# Patient Record
Sex: FEMALE | HISPANIC OR LATINO | URBAN - METROPOLITAN AREA
[De-identification: names, ages, dates, MRNs, and addresses within clinical notes are randomized per-mention and may not be internally consistent; named-entity substitution may affect disease eponyms.]

---

## 2023-01-31 ENCOUNTER — APPOINTMENT (RX ONLY)
Dept: URBAN - METROPOLITAN AREA CLINIC 335 | Facility: CLINIC | Age: 33
Setting detail: DERMATOLOGY
End: 2023-01-31

## 2023-01-31 DIAGNOSIS — L70.0 ACNE VULGARIS: ICD-10-CM | Status: INADEQUATELY CONTROLLED

## 2023-01-31 PROCEDURE — ? COUNSELING

## 2023-01-31 PROCEDURE — ? IN-HOUSE DISPENSING PHARMACY

## 2023-01-31 PROCEDURE — 99204 OFFICE O/P NEW MOD 45 MIN: CPT

## 2023-01-31 PROCEDURE — ? PRESCRIPTION

## 2023-01-31 RX ORDER — SPIRONOLACTONE 50 MG/1
TABLET, FILM COATED ORAL
Qty: 60 | Refills: 0 | Status: ERX | COMMUNITY
Start: 2023-01-31

## 2023-01-31 RX ORDER — AZELAIC ACID 0.15 G/G
GEL TOPICAL
Qty: 50 | Refills: 1 | Status: ERX | COMMUNITY
Start: 2023-01-31

## 2023-01-31 RX ORDER — DAPSONE 50 MG/G
GEL TOPICAL
Qty: 60 | Refills: 3 | Status: ERX | COMMUNITY
Start: 2023-01-31

## 2023-01-31 RX ADMIN — AZELAIC ACID: 0.15 GEL TOPICAL at 00:00

## 2023-01-31 RX ADMIN — SPIRONOLACTONE: 50 TABLET, FILM COATED ORAL at 00:00

## 2023-01-31 RX ADMIN — DAPSONE: 50 GEL TOPICAL at 00:00

## 2023-01-31 ASSESSMENT — LOCATION SIMPLE DESCRIPTION DERM: LOCATION SIMPLE: LEFT CHEEK

## 2023-01-31 ASSESSMENT — LOCATION ZONE DERM: LOCATION ZONE: FACE

## 2023-01-31 ASSESSMENT — LOCATION DETAILED DESCRIPTION DERM: LOCATION DETAILED: LEFT INFERIOR MEDIAL MALAR CHEEK

## 2023-01-31 NOTE — PROCEDURE: COUNSELING
High Dose Vitamin A Counseling: Side effects reviewed, pt to contact office should one occur.
Prescription Monitoring Program activity reviewed with no discrepancies noted.      Last fill per : 7/6/21  Quantity/days supply: 60 tab x 30 days     Controlled Substance Agreement on file: Yes  Date: 9/2019- Needs New CSA    Last office visit with provider:  7/13/21    Please advise.      
Topical Retinoid counseling:  Patient advised to apply a pea-sized amount only at bedtime and wait 30 minutes after washing their face before applying.  If too drying, patient may add a non-comedogenic moisturizer. The patient verbalized understanding of the proper use and possible adverse effects of retinoids.  All of the patient's questions and concerns were addressed.
Minocycline Counseling: Patient advised regarding possible photosensitivity and discoloration of the teeth, skin, lips, tongue and gums.  Patient instructed to avoid sunlight, if possible.  When exposed to sunlight, patients should wear protective clothing, sunglasses, and sunscreen.  The patient was instructed to call the office immediately if the following severe adverse effects occur:  hearing changes, easy bruising/bleeding, severe headache, or vision changes.  The patient verbalized understanding of the proper use and possible adverse effects of minocycline.  All of the patient's questions and concerns were addressed.
Tazorac Counseling:  Patient advised that medication is irritating and drying.  Patient may need to apply sparingly and wash off after an hour before eventually leaving it on overnight.  The patient verbalized understanding of the proper use and possible adverse effects of tazorac.  All of the patient's questions and concerns were addressed.
Doxycycline Pregnancy And Lactation Text: This medication is Pregnancy Category D and not consider safe during pregnancy. It is also excreted in breast milk but is considered safe for shorter treatment courses.
Topical Clindamycin Counseling: Patient counseled that this medication may cause skin irritation or allergic reactions.  In the event of skin irritation, the patient was advised to reduce the amount of the drug applied or use it less frequently.   The patient verbalized understanding of the proper use and possible adverse effects of clindamycin.  All of the patient's questions and concerns were addressed.
Azithromycin Counseling:  I discussed with the patient the risks of azithromycin including but not limited to GI upset, allergic reaction, drug rash, diarrhea, and yeast infections.
Tetracycline Pregnancy And Lactation Text: This medication is Pregnancy Category D and not consider safe during pregnancy. It is also excreted in breast milk.
Dapsone Pregnancy And Lactation Text: This medication is Pregnancy Category C and is not considered safe during pregnancy or breast feeding.
Use Enhanced Medication Counseling?: No
Aklief Pregnancy And Lactation Text: It is unknown if this medication is safe to use during pregnancy.  It is unknown if this medication is excreted in breast milk.  Breastfeeding women should use the topical cream on the smallest area of the skin for the shortest time needed while breastfeeding.  Do not apply to nipple and areola.
Azelaic Acid Counseling: Patient counseled that medicine may cause skin irritation and to avoid applying near the eyes.  In the event of skin irritation, the patient was advised to reduce the amount of the drug applied or use it less frequently.   The patient verbalized understanding of the proper use and possible adverse effects of azelaic acid.  All of the patient's questions and concerns were addressed.
Benzoyl Peroxide Counseling: Patient counseled that medicine may cause skin irritation and bleach clothing.  In the event of skin irritation, the patient was advised to reduce the amount of the drug applied or use it less frequently.   The patient verbalized understanding of the proper use and possible adverse effects of benzoyl peroxide.  All of the patient's questions and concerns were addressed.
Erythromycin Counseling:  I discussed with the patient the risks of erythromycin including but not limited to GI upset, allergic reaction, drug rash, diarrhea, increase in liver enzymes, and yeast infections.
Birth Control Pills Pregnancy And Lactation Text: This medication should be avoided if pregnant and for the first 30 days post-partum.
Topical Sulfur Applications Pregnancy And Lactation Text: This medication is Pregnancy Category C and has an unknown safety profile during pregnancy. It is unknown if this topical medication is excreted in breast milk.
Bactrim Pregnancy And Lactation Text: This medication is Pregnancy Category D and is known to cause fetal risk.  It is also excreted in breast milk.
Isotretinoin Counseling: Patient should get monthly blood tests, not donate blood, not drive at night if vision affected, not share medication, and not undergo elective surgery for 6 months after tx completed. Side effects reviewed, pt to contact office should one occur.
Winlevi Pregnancy And Lactation Text: This medication is considered safe during pregnancy and breastfeeding.
Spironolactone Pregnancy And Lactation Text: This medication can cause feminization of the male fetus and should be avoided during pregnancy. The active metabolite is also found in breast milk.
Tetracycline Counseling: Patient counseled regarding possible photosensitivity and increased risk for sunburn.  Patient instructed to avoid sunlight, if possible.  When exposed to sunlight, patients should wear protective clothing, sunglasses, and sunscreen.  The patient was instructed to call the office immediately if the following severe adverse effects occur:  hearing changes, easy bruising/bleeding, severe headache, or vision changes.  The patient verbalized understanding of the proper use and possible adverse effects of tetracycline.  All of the patient's questions and concerns were addressed. Patient understands to avoid pregnancy while on therapy due to potential birth defects.
Topical Retinoid Pregnancy And Lactation Text: This medication is Pregnancy Category C. It is unknown if this medication is excreted in breast milk.
Aklief counseling:  Patient advised to apply a pea-sized amount only at bedtime and wait 30 minutes after washing their face before applying.  If too drying, patient may add a non-comedogenic moisturizer.  The most commonly reported side effects including irritation, redness, scaling, dryness, stinging, burning, itching, and increased risk of sunburn.  The patient verbalized understanding of the proper use and possible adverse effects of retinoids.  All of the patient's questions and concerns were addressed.
Dapsone Counseling: I discussed with the patient the risks of dapsone including but not limited to hemolytic anemia, agranulocytosis, rashes, methemoglobinemia, kidney failure, peripheral neuropathy, headaches, GI upset, and liver toxicity.  Patients who start dapsone require monitoring including baseline LFTs and weekly CBCs for the first month, then every month thereafter.  The patient verbalized understanding of the proper use and possible adverse effects of dapsone.  All of the patient's questions and concerns were addressed.
Tazorac Pregnancy And Lactation Text: This medication is not safe during pregnancy. It is unknown if this medication is excreted in breast milk.
Azithromycin Pregnancy And Lactation Text: This medication is considered safe during pregnancy and is also secreted in breast milk.
Doxycycline Counseling:  Patient counseled regarding possible photosensitivity and increased risk for sunburn.  Patient instructed to avoid sunlight, if possible.  When exposed to sunlight, patients should wear protective clothing, sunglasses, and sunscreen.  The patient was instructed to call the office immediately if the following severe adverse effects occur:  hearing changes, easy bruising/bleeding, severe headache, or vision changes.  The patient verbalized understanding of the proper use and possible adverse effects of doxycycline.  All of the patient's questions and concerns were addressed.
High Dose Vitamin A Pregnancy And Lactation Text: High dose vitamin A therapy is contraindicated during pregnancy and breast feeding.
Topical Clindamycin Pregnancy And Lactation Text: This medication is Pregnancy Category B and is considered safe during pregnancy. It is unknown if it is excreted in breast milk.
Sarecycline Counseling: Patient advised regarding possible photosensitivity and discoloration of the teeth, skin, lips, tongue and gums.  Patient instructed to avoid sunlight, if possible.  When exposed to sunlight, patients should wear protective clothing, sunglasses, and sunscreen.  The patient was instructed to call the office immediately if the following severe adverse effects occur:  hearing changes, easy bruising/bleeding, severe headache, or vision changes.  The patient verbalized understanding of the proper use and possible adverse effects of sarecycline.  All of the patient's questions and concerns were addressed.
Azelaic Acid Pregnancy And Lactation Text: This medication is considered safe during pregnancy and breast feeding.
Detail Level: Zone
Spironolactone Counseling: Patient advised regarding risks of diarrhea, abdominal pain, hyperkalemia, birth defects (for female patients), liver toxicity and renal toxicity. The patient may need blood work to monitor liver and kidney function and potassium levels while on therapy. The patient verbalized understanding of the proper use and possible adverse effects of spironolactone.  All of the patient's questions and concerns were addressed.
Topical Sulfur Applications Counseling: Topical Sulfur Counseling: Patient counseled that this medication may cause skin irritation or allergic reactions.  In the event of skin irritation, the patient was advised to reduce the amount of the drug applied or use it less frequently.   The patient verbalized understanding of the proper use and possible adverse effects of topical sulfur application.  All of the patient's questions and concerns were addressed.
Bactrim Counseling:  I discussed with the patient the risks of sulfa antibiotics including but not limited to GI upset, allergic reaction, drug rash, diarrhea, dizziness, photosensitivity, and yeast infections.  Rarely, more serious reactions can occur including but not limited to aplastic anemia, agranulocytosis, methemoglobinemia, blood dyscrasias, liver or kidney failure, lung infiltrates or desquamative/blistering drug rashes.
Winlevi Counseling:  I discussed with the patient the risks of topical clascoterone including but not limited to erythema, scaling, itching, and stinging. Patient voiced their understanding.
Birth Control Pills Counseling: Birth Control Pill Counseling: I discussed with the patient the potential side effects of OCPs including but not limited to increased risk of stroke, heart attack, thrombophlebitis, deep venous thrombosis, hepatic adenomas, breast changes, GI upset, headaches, and depression.  The patient verbalized understanding of the proper use and possible adverse effects of OCPs. All of the patient's questions and concerns were addressed.
Erythromycin Pregnancy And Lactation Text: This medication is Pregnancy Category B and is considered safe during pregnancy. It is also excreted in breast milk.
Isotretinoin Pregnancy And Lactation Text: This medication is Pregnancy Category X and is considered extremely dangerous during pregnancy. It is unknown if it is excreted in breast milk.
Benzoyl Peroxide Pregnancy And Lactation Text: This medication is Pregnancy Category C. It is unknown if benzoyl peroxide is excreted in breast milk.

## 2023-01-31 NOTE — PROCEDURE: IN-HOUSE DISPENSING PHARMACY
Product 32 Refills: 0
Product 65 Unit Type: mg
Product 14 Refills: 3
Product 14 Amount/Unit (Numbers Only): 30
Detail Level: Zone
Product 18 Unit Type: tablets
Product 2 Price/Unit (In Dollars): 50
Name Of Product 19: Doxycycline
Product 21 Price/Unit (In Dollars): 45
Product 6 Application Directions: Apply to the affected areas on the scalp Monday, Wednesday and Friday.
Product 12 Application Directions: Apply a thin layer of Melasma Emulsion to the entire face at night time. For ultimate results we recommend using the Melasma Emulsion with c plus complex, a product sold through our office. Every morning you will apply  a thin layer  of c plus complex to the entire face. You will do the above regimen for 2 months. After 2 months, you will temporarily discontinue the Melasma Emulsion & only use the c plus complex twice a day for 2 months. After the 4 months, we recommend that you have a follow up appointment with your provider to evaluate if any other changes are needed.   If the  skin gets too dry or irritated with the Melasma Emulsion, then use it every third night.  The Melasma Emulsion will make your skin more sun-sensitive. Use a sunscreen daily of at least SPF 30, also \\n reapplying throughout the day is very important. Because Melasma Emulsion not meant to be used long term due to the potential side effects, we recommend to use lytera on your off days.
Product 6 Unit Type: ml
Product 23 Application Directions: Take 1 tablet every morning for 5 days
Send Charges To Patient Encounter: Yes
Name Of Product 11: Antibacterial ointment ( METRONIDAZOLE 1% / MUPIROCIN 2%)
Product 25 Amount/Unit (Numbers Only): 14
Product 4 Amount/Unit (Numbers Only): 30
Product 12 Unit Type: grams
Name Of Product 13: Rosacea Cream (AZELAIC ACID 15% / NIACINAMIDE 4%)
Product 17 Price/Unit (In Dollars): 20
Product 17 Refills: 4
Product 26 Price/Unit (In Dollars): 10
Product 11 Application Directions: Apply to affected area twice a day
Product 19 Application Directions: Take 1 Capsule twice daily
Name Of Product 7: Anti-fungal nail solution (CICLOPIROX 8% / FLUCONAZOLE 1% / TERBINAFINE HCL 1%)
Product 21 Amount/Unit (Numbers Only): 60
Name Of Product 24: Spironlactone
Name Of Product 22: Prednisone 20 mg
Product 15 Application Directions: Shampoo into hair three times a week
Product 9 Application Directions: Apply to affected area twice a day, three times a week
Product 26 Application Directions: Take 1 capsule twice daily for 1 week
Product 1 Price/Unit (In Dollars): 55
Product 20 Price/Unit (In Dollars): 15
Product 5 Application Directions: Apply to the affected areas as directed by your physician. You may experience mild skin irritation, itching, dryness, flaking, scabbing, crusting, redness, or hardening of the skin where medicine was applied. It is important to \\navoid excessive sun exposure and to use sunscreen of 30 SPF or higher.
Name Of Product 10: Fungal dermatitis cream ( (HYDROCORTISONE 2.5% / KETOCONAZOLE 2%)
Name Of Product 18: Bactrim DS
Product 3 Application Directions: Apply a thin layer to face every morning
Product 22 Application Directions: Take 3 tablets by mouth for 3 days, Then take 2 tablets for 3 days, then take 1 tablet for 3 days
Name Of Product 16: Dermatitis topical solution (CLOBETASOL PROPIONATE 0.05% / NIACINAMIDE 4%)
Name Of Product 4: Acne gel  (ADAPALENE 0.3% / BENZOYL PEROXIDE 2.5% / NIACINAMIDE 4%)
Name Of Product 6: Alopecia Solution (FLUOCINOLONE ACETONIDE 0.01% / MINOXIDIL 5% / TRETINOIN 0.025%)
Product 8 Price/Unit (In Dollars): 35
Product 16 Application Directions: Apply a small amount to affected areas three times a week
Product 8 Refills: 2
Name Of Product 23: Prednisone 50 mg
Product 10 Application Directions: Apply to affected are twice a day
Product 18 Application Directions: Take 1 tablet twice daily for 1 week
Product 8 Application Directions: Apply to affected area twice daily
Product 14 Application Directions: Apply a small amount to face once daily
Name Of Product 2: Acne moisturizing cream (NIACINAMIDE 4% / TRETINOIN 0.05% / HYALURONIC ACID 0.5% CREAM)
Name Of Product 21: Minocycline
Product 4 Application Directions: Apply a thin layer to face every night
Product 23 Amount/Unit (Numbers Only): 5
Product 25 Unit Type: capsules
Name Of Product 15: Anti-Fungal Shampoo ( KETOCONAZOLE 2% / SALICYLIC ACID 2% / ZINC PYRITHIONE 0.2%)
Name Of Product 17: Xerosis Gel ( ALOE VERA 1% / LACTIC ACID 10% / UREA 40%)
Name Of Product 9: Dermatitis cream  (CLOBETASOL PROPIONATE 0.05% / NIACINAMIDE 4%)
Product 21 Application Directions: Take 1 capsule by mouth twice daily
Name Of Product 26: Cephalexin
Product 17 Application Directions: Apply a small amount to affected areas twice daily
Name Of Product 3: Acne gel combo  (BENZOYL PEROXIDE 5% / CLINDAMYCIN 1% / NIACINAMIDE 4%)
Name Of Product 5: Actinic Keratosis Gel (IMIQUIMOD 5% / LEVOCETIRIZINE DIHYDROCHLORIDE 1% / TRETINOIN 0.05%)
Product 17 Amount/Unit (Numbers Only): 120
Product 3 Price/Unit (In Dollars): 40
Product 9 Amount/Unit (Numbers Only): 60
Product 13 Application Directions: Apply to affected areas of the face twice daily.
Product 7 Application Directions: Apply to the affected nails every night. Once a week clean nails off     \\nwith alcohol or acetone.
Name Of Product 1: Acne Gel (DAPSONE 8.5% / NIACINAMIDE 4%)
Name Of Product 12: Melasma Emulsion  (HYDROQUINONE 4% / TRETINOIN 0.025% / TRIAMCINOLONE ACETONIDE 0.025%)
Name Of Product 20: Loratadine
Product 24 Application Directions: Take 1 tablet twice daily
Product 12 Units Dispensed: 1
Product 20 Application Directions: Take 1 tablet by mouth daily
Product 22 Amount/Unit (Numbers Only): 18
Name Of Product 8: Anti-fungal cream (ECONAZOLE NITRATE 1% / NIACINAMIDE 4%)
Name Of Product 14: Rosacea Silicone Gel (IVERMECTIN 1% / METRONIDAZOLE 1% / NIACINAMIDE 4% / POTASSIUM AZELOYL DIGLYCINATE 5%)

## 2023-02-28 ENCOUNTER — RX ONLY (OUTPATIENT)
Age: 33
Setting detail: RX ONLY
End: 2023-02-28

## 2023-02-28 RX ORDER — SPIRONOLACTONE 50 MG/1
TABLET, FILM COATED ORAL
Qty: 60 | Refills: 1 | Status: ERX

## 2023-03-27 ENCOUNTER — APPOINTMENT (RX ONLY)
Dept: URBAN - METROPOLITAN AREA CLINIC 335 | Facility: CLINIC | Age: 33
Setting detail: DERMATOLOGY
End: 2023-03-27

## 2023-03-27 DIAGNOSIS — L70.0 ACNE VULGARIS: ICD-10-CM

## 2023-03-27 PROCEDURE — ? PRESCRIPTION MEDICATION MANAGEMENT

## 2023-03-27 PROCEDURE — ? IN-HOUSE DISPENSING PHARMACY

## 2023-03-27 PROCEDURE — ? PRESCRIPTION

## 2023-03-27 PROCEDURE — ? COUNSELING

## 2023-03-27 PROCEDURE — 99214 OFFICE O/P EST MOD 30 MIN: CPT

## 2023-03-27 RX ORDER — TRETIONIN 0.25 MG/G
CREAM TOPICAL QHS
Qty: 45 | Refills: 3 | Status: ERX | COMMUNITY
Start: 2023-03-27

## 2023-03-27 RX ADMIN — TRETIONIN: 0.25 CREAM TOPICAL at 00:00

## 2023-03-27 ASSESSMENT — LOCATION DETAILED DESCRIPTION DERM: LOCATION DETAILED: LEFT INFERIOR MEDIAL MALAR CHEEK

## 2023-03-27 ASSESSMENT — LOCATION SIMPLE DESCRIPTION DERM: LOCATION SIMPLE: LEFT CHEEK

## 2023-03-27 ASSESSMENT — LOCATION ZONE DERM: LOCATION ZONE: FACE

## 2023-03-27 NOTE — PROCEDURE: PRESCRIPTION MEDICATION MANAGEMENT
Render In Strict Bullet Format?: No
Continue Regimen: spironolactone 50 mg tablet \\nSig: Take 1 tablet twice a day\\n\\ndapsone 5 % topical gel \\nSig: Apply to affected areas of the face every morning
Detail Level: Zone

## 2023-03-27 NOTE — PROCEDURE: COUNSELING
High Dose Vitamin A Counseling: Side effects reviewed, pt to contact office should one occur.
Topical Retinoid counseling:  Patient advised to apply a pea-sized amount only at bedtime and wait 30 minutes after washing their face before applying.  If too drying, patient may add a non-comedogenic moisturizer. The patient verbalized understanding of the proper use and possible adverse effects of retinoids.  All of the patient's questions and concerns were addressed.
Minocycline Counseling: Patient advised regarding possible photosensitivity and discoloration of the teeth, skin, lips, tongue and gums.  Patient instructed to avoid sunlight, if possible.  When exposed to sunlight, patients should wear protective clothing, sunglasses, and sunscreen.  The patient was instructed to call the office immediately if the following severe adverse effects occur:  hearing changes, easy bruising/bleeding, severe headache, or vision changes.  The patient verbalized understanding of the proper use and possible adverse effects of minocycline.  All of the patient's questions and concerns were addressed.
Tazorac Counseling:  Patient advised that medication is irritating and drying.  Patient may need to apply sparingly and wash off after an hour before eventually leaving it on overnight.  The patient verbalized understanding of the proper use and possible adverse effects of tazorac.  All of the patient's questions and concerns were addressed.
Doxycycline Pregnancy And Lactation Text: This medication is Pregnancy Category D and not consider safe during pregnancy. It is also excreted in breast milk but is considered safe for shorter treatment courses.
Topical Clindamycin Counseling: Patient counseled that this medication may cause skin irritation or allergic reactions.  In the event of skin irritation, the patient was advised to reduce the amount of the drug applied or use it less frequently.   The patient verbalized understanding of the proper use and possible adverse effects of clindamycin.  All of the patient's questions and concerns were addressed.
Azithromycin Counseling:  I discussed with the patient the risks of azithromycin including but not limited to GI upset, allergic reaction, drug rash, diarrhea, and yeast infections.
Tetracycline Pregnancy And Lactation Text: This medication is Pregnancy Category D and not consider safe during pregnancy. It is also excreted in breast milk.
Dapsone Pregnancy And Lactation Text: This medication is Pregnancy Category C and is not considered safe during pregnancy or breast feeding.
Use Enhanced Medication Counseling?: No
Aklief Pregnancy And Lactation Text: It is unknown if this medication is safe to use during pregnancy.  It is unknown if this medication is excreted in breast milk.  Breastfeeding women should use the topical cream on the smallest area of the skin for the shortest time needed while breastfeeding.  Do not apply to nipple and areola.
Azelaic Acid Counseling: Patient counseled that medicine may cause skin irritation and to avoid applying near the eyes.  In the event of skin irritation, the patient was advised to reduce the amount of the drug applied or use it less frequently.   The patient verbalized understanding of the proper use and possible adverse effects of azelaic acid.  All of the patient's questions and concerns were addressed.
Benzoyl Peroxide Counseling: Patient counseled that medicine may cause skin irritation and bleach clothing.  In the event of skin irritation, the patient was advised to reduce the amount of the drug applied or use it less frequently.   The patient verbalized understanding of the proper use and possible adverse effects of benzoyl peroxide.  All of the patient's questions and concerns were addressed.
Erythromycin Counseling:  I discussed with the patient the risks of erythromycin including but not limited to GI upset, allergic reaction, drug rash, diarrhea, increase in liver enzymes, and yeast infections.
Birth Control Pills Pregnancy And Lactation Text: This medication should be avoided if pregnant and for the first 30 days post-partum.
Topical Sulfur Applications Pregnancy And Lactation Text: This medication is Pregnancy Category C and has an unknown safety profile during pregnancy. It is unknown if this topical medication is excreted in breast milk.
Bactrim Pregnancy And Lactation Text: This medication is Pregnancy Category D and is known to cause fetal risk.  It is also excreted in breast milk.
Isotretinoin Counseling: Patient should get monthly blood tests, not donate blood, not drive at night if vision affected, not share medication, and not undergo elective surgery for 6 months after tx completed. Side effects reviewed, pt to contact office should one occur.
Winlevi Pregnancy And Lactation Text: This medication is considered safe during pregnancy and breastfeeding.
Spironolactone Pregnancy And Lactation Text: This medication can cause feminization of the male fetus and should be avoided during pregnancy. The active metabolite is also found in breast milk.
Tetracycline Counseling: Patient counseled regarding possible photosensitivity and increased risk for sunburn.  Patient instructed to avoid sunlight, if possible.  When exposed to sunlight, patients should wear protective clothing, sunglasses, and sunscreen.  The patient was instructed to call the office immediately if the following severe adverse effects occur:  hearing changes, easy bruising/bleeding, severe headache, or vision changes.  The patient verbalized understanding of the proper use and possible adverse effects of tetracycline.  All of the patient's questions and concerns were addressed. Patient understands to avoid pregnancy while on therapy due to potential birth defects.
Topical Retinoid Pregnancy And Lactation Text: This medication is Pregnancy Category C. It is unknown if this medication is excreted in breast milk.
Aklief counseling:  Patient advised to apply a pea-sized amount only at bedtime and wait 30 minutes after washing their face before applying.  If too drying, patient may add a non-comedogenic moisturizer.  The most commonly reported side effects including irritation, redness, scaling, dryness, stinging, burning, itching, and increased risk of sunburn.  The patient verbalized understanding of the proper use and possible adverse effects of retinoids.  All of the patient's questions and concerns were addressed.
Dapsone Counseling: I discussed with the patient the risks of dapsone including but not limited to hemolytic anemia, agranulocytosis, rashes, methemoglobinemia, kidney failure, peripheral neuropathy, headaches, GI upset, and liver toxicity.  Patients who start dapsone require monitoring including baseline LFTs and weekly CBCs for the first month, then every month thereafter.  The patient verbalized understanding of the proper use and possible adverse effects of dapsone.  All of the patient's questions and concerns were addressed.
Tazorac Pregnancy And Lactation Text: This medication is not safe during pregnancy. It is unknown if this medication is excreted in breast milk.
Azithromycin Pregnancy And Lactation Text: This medication is considered safe during pregnancy and is also secreted in breast milk.
Doxycycline Counseling:  Patient counseled regarding possible photosensitivity and increased risk for sunburn.  Patient instructed to avoid sunlight, if possible.  When exposed to sunlight, patients should wear protective clothing, sunglasses, and sunscreen.  The patient was instructed to call the office immediately if the following severe adverse effects occur:  hearing changes, easy bruising/bleeding, severe headache, or vision changes.  The patient verbalized understanding of the proper use and possible adverse effects of doxycycline.  All of the patient's questions and concerns were addressed.
High Dose Vitamin A Pregnancy And Lactation Text: High dose vitamin A therapy is contraindicated during pregnancy and breast feeding.
Topical Clindamycin Pregnancy And Lactation Text: This medication is Pregnancy Category B and is considered safe during pregnancy. It is unknown if it is excreted in breast milk.
Sarecycline Counseling: Patient advised regarding possible photosensitivity and discoloration of the teeth, skin, lips, tongue and gums.  Patient instructed to avoid sunlight, if possible.  When exposed to sunlight, patients should wear protective clothing, sunglasses, and sunscreen.  The patient was instructed to call the office immediately if the following severe adverse effects occur:  hearing changes, easy bruising/bleeding, severe headache, or vision changes.  The patient verbalized understanding of the proper use and possible adverse effects of sarecycline.  All of the patient's questions and concerns were addressed.
Azelaic Acid Pregnancy And Lactation Text: This medication is considered safe during pregnancy and breast feeding.
Detail Level: Zone
Spironolactone Counseling: Patient advised regarding risks of diarrhea, abdominal pain, hyperkalemia, birth defects (for female patients), liver toxicity and renal toxicity. The patient may need blood work to monitor liver and kidney function and potassium levels while on therapy. The patient verbalized understanding of the proper use and possible adverse effects of spironolactone.  All of the patient's questions and concerns were addressed.
Topical Sulfur Applications Counseling: Topical Sulfur Counseling: Patient counseled that this medication may cause skin irritation or allergic reactions.  In the event of skin irritation, the patient was advised to reduce the amount of the drug applied or use it less frequently.   The patient verbalized understanding of the proper use and possible adverse effects of topical sulfur application.  All of the patient's questions and concerns were addressed.
Bactrim Counseling:  I discussed with the patient the risks of sulfa antibiotics including but not limited to GI upset, allergic reaction, drug rash, diarrhea, dizziness, photosensitivity, and yeast infections.  Rarely, more serious reactions can occur including but not limited to aplastic anemia, agranulocytosis, methemoglobinemia, blood dyscrasias, liver or kidney failure, lung infiltrates or desquamative/blistering drug rashes.
Winlevi Counseling:  I discussed with the patient the risks of topical clascoterone including but not limited to erythema, scaling, itching, and stinging. Patient voiced their understanding.
Birth Control Pills Counseling: Birth Control Pill Counseling: I discussed with the patient the potential side effects of OCPs including but not limited to increased risk of stroke, heart attack, thrombophlebitis, deep venous thrombosis, hepatic adenomas, breast changes, GI upset, headaches, and depression.  The patient verbalized understanding of the proper use and possible adverse effects of OCPs. All of the patient's questions and concerns were addressed.
Erythromycin Pregnancy And Lactation Text: This medication is Pregnancy Category B and is considered safe during pregnancy. It is also excreted in breast milk.
Isotretinoin Pregnancy And Lactation Text: This medication is Pregnancy Category X and is considered extremely dangerous during pregnancy. It is unknown if it is excreted in breast milk.
Benzoyl Peroxide Pregnancy And Lactation Text: This medication is Pregnancy Category C. It is unknown if benzoyl peroxide is excreted in breast milk.

## 2023-03-27 NOTE — PROCEDURE: IN-HOUSE DISPENSING PHARMACY
Product 32 Refills: 0
Product 65 Unit Type: mg
Product 14 Refills: 3
Product 14 Amount/Unit (Numbers Only): 30
Detail Level: Zone
Product 18 Unit Type: tablets
Product 2 Price/Unit (In Dollars): 50
Name Of Product 19: Doxycycline
Product 21 Price/Unit (In Dollars): 45
Product 6 Application Directions: Apply to the affected areas on the scalp Monday, Wednesday and Friday.
Product 12 Application Directions: Apply a thin layer of Melasma Emulsion to the entire face at night time. For ultimate results we recommend using the Melasma Emulsion with c plus complex, a product sold through our office. Every morning you will apply  a thin layer  of c plus complex to the entire face. You will do the above regimen for 2 months. After 2 months, you will temporarily discontinue the Melasma Emulsion & only use the c plus complex twice a day for 2 months. After the 4 months, we recommend that you have a follow up appointment with your provider to evaluate if any other changes are needed.   If the  skin gets too dry or irritated with the Melasma Emulsion, then use it every third night.  The Melasma Emulsion will make your skin more sun-sensitive. Use a sunscreen daily of at least SPF 30, also \\n reapplying throughout the day is very important. Because Melasma Emulsion not meant to be used long term due to the potential side effects, we recommend to use lytera on your off days.
Product 6 Unit Type: ml
Product 23 Application Directions: Take 1 tablet every morning for 5 days
Send Charges To Patient Encounter: Yes
Name Of Product 11: Antibacterial ointment ( METRONIDAZOLE 1% / MUPIROCIN 2%)
Product 25 Amount/Unit (Numbers Only): 14
Product 12 Unit Type: grams
Name Of Product 13: Rosacea Cream (AZELAIC ACID 15% / NIACINAMIDE 4%)
Product 17 Price/Unit (In Dollars): 20
Product 17 Refills: 4
Product 26 Price/Unit (In Dollars): 10
Product 11 Application Directions: Apply to affected area twice a day
Product 19 Application Directions: Take 1 Capsule twice daily
Name Of Product 7: Anti-fungal nail solution (CICLOPIROX 8% / FLUCONAZOLE 1% / TERBINAFINE HCL 1%)
Product 21 Amount/Unit (Numbers Only): 60
Product 13 Units Dispensed: 1
Name Of Product 24: Spironlactone
Name Of Product 22: Prednisone 20 mg
Product 15 Application Directions: Shampoo into hair three times a week
Product 9 Application Directions: Apply to affected area twice a day, three times a week
Product 26 Application Directions: Take 1 capsule twice daily for 1 week
Product 1 Price/Unit (In Dollars): 55
Product 20 Price/Unit (In Dollars): 15
Product 5 Application Directions: Apply to the affected areas as directed by your physician. You may experience mild skin irritation, itching, dryness, flaking, scabbing, crusting, redness, or hardening of the skin where medicine was applied. It is important to \\navoid excessive sun exposure and to use sunscreen of 30 SPF or higher.
Name Of Product 10: Fungal dermatitis cream ( (HYDROCORTISONE 2.5% / KETOCONAZOLE 2%)
Name Of Product 18: Bactrim DS
Product 3 Application Directions: Apply a thin layer to face every morning
Product 22 Application Directions: Take 3 tablets by mouth for 3 days, Then take 2 tablets for 3 days, then take 1 tablet for 3 days
Name Of Product 16: Dermatitis topical solution (CLOBETASOL PROPIONATE 0.05% / NIACINAMIDE 4%)
Name Of Product 4: Acne gel  (ADAPALENE 0.3% / BENZOYL PEROXIDE 2.5% / NIACINAMIDE 4%)
Name Of Product 6: Alopecia Solution (FLUOCINOLONE ACETONIDE 0.01% / MINOXIDIL 5% / TRETINOIN 0.025%)
Product 20 Amount/Unit (Numbers Only): 30
Product 8 Price/Unit (In Dollars): 35
Product 16 Application Directions: Apply a small amount to affected areas three times a week
Product 8 Refills: 2
Name Of Product 23: Prednisone 50 mg
Product 10 Application Directions: Apply to affected are twice a day
Product 18 Application Directions: Take 1 tablet twice daily for 1 week
Product 8 Application Directions: Apply to affected area twice daily
Product 14 Application Directions: Apply a small amount to face once daily
Name Of Product 2: Acne moisturizing cream (NIACINAMIDE 4% / TRETINOIN 0.05% / HYALURONIC ACID 0.5% CREAM)
Name Of Product 21: Minocycline
Product 4 Application Directions: Apply a thin layer to face every night
Product 23 Amount/Unit (Numbers Only): 5
Product 25 Unit Type: capsules
Name Of Product 15: Anti-Fungal Shampoo ( KETOCONAZOLE 2% / SALICYLIC ACID 2% / ZINC PYRITHIONE 0.2%)
Name Of Product 17: Xerosis Gel ( ALOE VERA 1% / LACTIC ACID 10% / UREA 40%)
Name Of Product 9: Dermatitis cream  (CLOBETASOL PROPIONATE 0.05% / NIACINAMIDE 4%)
Product 21 Application Directions: Take 1 capsule by mouth twice daily
Name Of Product 26: Cephalexin
Product 17 Application Directions: Apply a small amount to affected areas twice daily
Name Of Product 3: Acne gel combo  (BENZOYL PEROXIDE 5% / CLINDAMYCIN 1% / NIACINAMIDE 4%)
Name Of Product 5: Actinic Keratosis Gel (IMIQUIMOD 5% / LEVOCETIRIZINE DIHYDROCHLORIDE 1% / TRETINOIN 0.05%)
Product 17 Amount/Unit (Numbers Only): 120
Product 3 Price/Unit (In Dollars): 40
Product 13 Application Directions: Apply to affected areas of the face twice daily.
Product 7 Application Directions: Apply to the affected nails every night. Once a week clean nails off     \\nwith alcohol or acetone.
Name Of Product 1: Acne Gel (DAPSONE 8.5% / NIACINAMIDE 4%)
Name Of Product 12: Melasma Emulsion  (HYDROQUINONE 4% / TRETINOIN 0.025% / TRIAMCINOLONE ACETONIDE 0.025%)
Name Of Product 20: Loratadine
Product 24 Application Directions: Take 1 tablet twice daily
Product 20 Application Directions: Take 1 tablet by mouth daily
Product 22 Amount/Unit (Numbers Only): 18
Name Of Product 8: Anti-fungal cream (ECONAZOLE NITRATE 1% / NIACINAMIDE 4%)
Name Of Product 14: Rosacea Silicone Gel (IVERMECTIN 1% / METRONIDAZOLE 1% / NIACINAMIDE 4% / POTASSIUM AZELOYL DIGLYCINATE 5%)

## 2023-03-28 ENCOUNTER — RX ONLY (OUTPATIENT)
Age: 33
Setting detail: RX ONLY
End: 2023-03-28

## 2023-03-28 RX ORDER — SPIRONOLACTONE 50 MG/1
TABLET, FILM COATED ORAL
Qty: 60 | Refills: 1 | Status: ERX

## 2023-04-18 ENCOUNTER — RX ONLY (OUTPATIENT)
Age: 33
Setting detail: RX ONLY
End: 2023-04-18

## 2023-04-18 RX ORDER — SPIRONOLACTONE 50 MG/1
TABLET, FILM COATED ORAL
Qty: 60 | Refills: 0 | Status: ERX

## 2023-05-25 ENCOUNTER — APPOINTMENT (RX ONLY)
Dept: URBAN - METROPOLITAN AREA CLINIC 335 | Facility: CLINIC | Age: 33
Setting detail: DERMATOLOGY
End: 2023-05-25

## 2023-05-25 ENCOUNTER — RX ONLY (OUTPATIENT)
Age: 33
Setting detail: RX ONLY
End: 2023-05-25

## 2023-05-25 DIAGNOSIS — L70.0 ACNE VULGARIS: ICD-10-CM | Status: IMPROVED

## 2023-05-25 PROCEDURE — ? IN-HOUSE DISPENSING PHARMACY

## 2023-05-25 PROCEDURE — ? COUNSELING

## 2023-05-25 PROCEDURE — 99214 OFFICE O/P EST MOD 30 MIN: CPT

## 2023-05-25 PROCEDURE — ? PRESCRIPTION MEDICATION MANAGEMENT

## 2023-05-25 RX ORDER — SPIRONOLACTONE 50 MG/1
TABLET, FILM COATED ORAL
Qty: 60 | Refills: 0 | Status: ERX

## 2023-05-25 ASSESSMENT — LOCATION ZONE DERM: LOCATION ZONE: FACE

## 2023-05-25 ASSESSMENT — LOCATION DETAILED DESCRIPTION DERM: LOCATION DETAILED: LEFT INFERIOR MEDIAL MALAR CHEEK

## 2023-05-25 ASSESSMENT — LOCATION SIMPLE DESCRIPTION DERM: LOCATION SIMPLE: LEFT CHEEK

## 2023-05-25 NOTE — PROCEDURE: IN-HOUSE DISPENSING PHARMACY
Product 32 Refills: 0
Product 65 Unit Type: mg
Product 14 Refills: 3
Product 14 Amount/Unit (Numbers Only): 30
Detail Level: Zone
Product 18 Unit Type: tablets
Product 2 Price/Unit (In Dollars): 50
Name Of Product 19: Doxycycline
Product 21 Price/Unit (In Dollars): 45
Product 6 Application Directions: Apply to the affected areas on the scalp Monday, Wednesday and Friday.
Product 12 Application Directions: Apply a thin layer of Melasma Emulsion to the entire face at night time. For ultimate results we recommend using the Melasma Emulsion with c plus complex, a product sold through our office. Every morning you will apply  a thin layer  of c plus complex to the entire face. You will do the above regimen for 2 months. After 2 months, you will temporarily discontinue the Melasma Emulsion & only use the c plus complex twice a day for 2 months. After the 4 months, we recommend that you have a follow up appointment with your provider to evaluate if any other changes are needed.   If the  skin gets too dry or irritated with the Melasma Emulsion, then use it every third night.  The Melasma Emulsion will make your skin more sun-sensitive. Use a sunscreen daily of at least SPF 30, also \\n reapplying throughout the day is very important. Because Melasma Emulsion not meant to be used long term due to the potential side effects, we recommend to use lytera on your off days.
Product 6 Unit Type: ml
Product 23 Application Directions: Take 1 tablet every morning for 5 days
Send Charges To Patient Encounter: Yes
Name Of Product 11: Antibacterial ointment ( METRONIDAZOLE 1% / MUPIROCIN 2%)
Product 25 Amount/Unit (Numbers Only): 14
Product 12 Unit Type: grams
Name Of Product 13: Rosacea Cream (AZELAIC ACID 15% / NIACINAMIDE 4%)
Product 17 Price/Unit (In Dollars): 20
Product 17 Refills: 4
Product 26 Price/Unit (In Dollars): 10
Product 11 Application Directions: Apply to affected area twice a day
Product 19 Application Directions: Take 1 Capsule twice daily
Name Of Product 7: Anti-fungal nail solution (CICLOPIROX 8% / FLUCONAZOLE 1% / TERBINAFINE HCL 1%)
Product 21 Amount/Unit (Numbers Only): 60
Product 13 Units Dispensed: 1
Name Of Product 24: Spironlactone
Name Of Product 22: Prednisone 20 mg
Product 15 Application Directions: Shampoo into hair three times a week
Product 9 Application Directions: Apply to affected area twice a day, three times a week
Product 26 Application Directions: Take 1 capsule twice daily for 1 week
Product 1 Price/Unit (In Dollars): 55
Product 20 Price/Unit (In Dollars): 15
Product 5 Application Directions: Apply to the affected areas as directed by your physician. You may experience mild skin irritation, itching, dryness, flaking, scabbing, crusting, redness, or hardening of the skin where medicine was applied. It is important to \\navoid excessive sun exposure and to use sunscreen of 30 SPF or higher.
Name Of Product 10: Fungal dermatitis cream ( (HYDROCORTISONE 2.5% / KETOCONAZOLE 2%)
Name Of Product 18: Bactrim DS
Product 3 Application Directions: Apply a thin layer to face every morning
Product 22 Application Directions: Take 3 tablets by mouth for 3 days, Then take 2 tablets for 3 days, then take 1 tablet for 3 days
Name Of Product 16: Dermatitis topical solution (CLOBETASOL PROPIONATE 0.05% / NIACINAMIDE 4%)
Product 1 Amount/Unit (Numbers Only): 30
Name Of Product 4: Acne gel  (ADAPALENE 0.3% / BENZOYL PEROXIDE 2.5% / NIACINAMIDE 4%)
Name Of Product 6: Alopecia Solution (FLUOCINOLONE ACETONIDE 0.01% / MINOXIDIL 5% / TRETINOIN 0.025%)
Product 8 Price/Unit (In Dollars): 35
Product 16 Application Directions: Apply a small amount to affected areas three times a week
Product 8 Refills: 2
Name Of Product 23: Prednisone 50 mg
Product 10 Application Directions: Apply to affected are twice a day
Product 18 Application Directions: Take 1 tablet twice daily for 1 week
Product 8 Application Directions: Apply to affected area twice daily
Product 14 Application Directions: Apply a small amount to face once daily
Name Of Product 2: Acne moisturizing cream (NIACINAMIDE 4% / TRETINOIN 0.05% / HYALURONIC ACID 0.5% CREAM)
Name Of Product 21: Minocycline
Product 4 Application Directions: Apply a thin layer to face every night
Product 23 Amount/Unit (Numbers Only): 5
Product 25 Unit Type: capsules
Name Of Product 15: Anti-Fungal Shampoo ( KETOCONAZOLE 2% / SALICYLIC ACID 2% / ZINC PYRITHIONE 0.2%)
Name Of Product 17: Xerosis Gel ( ALOE VERA 1% / LACTIC ACID 10% / UREA 40%)
Name Of Product 9: Dermatitis cream  (CLOBETASOL PROPIONATE 0.05% / NIACINAMIDE 4%)
Product 21 Application Directions: Take 1 capsule by mouth twice daily
Name Of Product 26: Cephalexin
Product 17 Application Directions: Apply a small amount to affected areas twice daily
Name Of Product 3: Acne gel combo  (BENZOYL PEROXIDE 5% / CLINDAMYCIN 1% / NIACINAMIDE 4%)
Name Of Product 5: Actinic Keratosis Gel (IMIQUIMOD 5% / LEVOCETIRIZINE DIHYDROCHLORIDE 1% / TRETINOIN 0.05%)
Product 17 Amount/Unit (Numbers Only): 120
Product 3 Price/Unit (In Dollars): 40
Product 13 Application Directions: Apply to affected areas of the face twice daily.
Product 7 Application Directions: Apply to the affected nails every night. Once a week clean nails off     \\nwith alcohol or acetone.
Name Of Product 1: Acne Gel (DAPSONE 8.5% / NIACINAMIDE 4%)
Name Of Product 12: Melasma Emulsion  (HYDROQUINONE 4% / TRETINOIN 0.025% / TRIAMCINOLONE ACETONIDE 0.025%)
Name Of Product 20: Loratadine
Product 24 Application Directions: Take 1 tablet twice daily
Product 20 Application Directions: Take 1 tablet by mouth daily
Product 22 Amount/Unit (Numbers Only): 18
Name Of Product 8: Anti-fungal cream (ECONAZOLE NITRATE 1% / NIACINAMIDE 4%)
Name Of Product 14: Rosacea Silicone Gel (IVERMECTIN 1% / METRONIDAZOLE 1% / NIACINAMIDE 4% / POTASSIUM AZELOYL DIGLYCINATE 5%)

## 2023-05-25 NOTE — PROCEDURE: PRESCRIPTION MEDICATION MANAGEMENT
Render In Strict Bullet Format?: No
Continue Regimen: spironolactone 50 mg tablet \\nSig: Take 1 tablet twice a day\\n\\ndapsone 5 % topical gel \\nSig: Apply to affected areas of the face every morning\\n\\nVitamin C Complex: apply to the face everyday
Detail Level: Zone

## 2023-09-05 ENCOUNTER — APPOINTMENT (RX ONLY)
Dept: URBAN - METROPOLITAN AREA CLINIC 335 | Facility: CLINIC | Age: 33
Setting detail: DERMATOLOGY
End: 2023-09-05

## 2023-09-05 ENCOUNTER — RX ONLY (OUTPATIENT)
Age: 33
Setting detail: RX ONLY
End: 2023-09-05

## 2023-09-05 DIAGNOSIS — L70.0 ACNE VULGARIS: ICD-10-CM

## 2023-09-05 PROCEDURE — ? PRESCRIPTION

## 2023-09-05 PROCEDURE — 99214 OFFICE O/P EST MOD 30 MIN: CPT

## 2023-09-05 PROCEDURE — ? PRESCRIPTION MEDICATION MANAGEMENT

## 2023-09-05 PROCEDURE — ? COUNSELING

## 2023-09-05 RX ORDER — SPIRONOLACTONE 50 MG/1
TABLET, FILM COATED ORAL
Qty: 60 | Refills: 0 | Status: ERX

## 2023-09-05 ASSESSMENT — LOCATION SIMPLE DESCRIPTION DERM: LOCATION SIMPLE: LEFT CHEEK

## 2023-09-05 ASSESSMENT — LOCATION DETAILED DESCRIPTION DERM: LOCATION DETAILED: LEFT INFERIOR MEDIAL MALAR CHEEK

## 2023-09-05 ASSESSMENT — LOCATION ZONE DERM: LOCATION ZONE: FACE

## 2023-09-05 NOTE — PROCEDURE: PRESCRIPTION MEDICATION MANAGEMENT
Render In Strict Bullet Format?: No
Initiate Treatment: Tretinoin - once nightly
Continue Regimen: spironolactone 50 mg tablet \\nSig: Take 1 tablet twice a day\\n\\ndapsone 5 % topical gel \\nSig: Apply to affected areas of the face every morning\\n\\nVitamin C Complex: apply to the face everyday
Detail Level: Zone

## 2023-12-07 ENCOUNTER — RX ONLY (OUTPATIENT)
Age: 33
Setting detail: RX ONLY
End: 2023-12-07

## 2023-12-07 RX ORDER — SPIRONOLACTONE 50 MG/1
TABLET, FILM COATED ORAL
Qty: 90 | Refills: 0 | Status: ERX

## 2024-12-03 ENCOUNTER — APPOINTMENT (OUTPATIENT)
Dept: URBAN - METROPOLITAN AREA CLINIC 335 | Facility: CLINIC | Age: 34
Setting detail: DERMATOLOGY
End: 2024-12-03

## 2024-12-03 DIAGNOSIS — L70.0 ACNE VULGARIS: ICD-10-CM | Status: IMPROVED

## 2024-12-03 DIAGNOSIS — Z41.9 ENCOUNTER FOR PROCEDURE FOR PURPOSES OTHER THAN REMEDYING HEALTH STATE, UNSPECIFIED: ICD-10-CM

## 2024-12-03 DIAGNOSIS — L81.1 CHLOASMA: ICD-10-CM | Status: INADEQUATELY CONTROLLED

## 2024-12-03 PROCEDURE — ? PRESCRIPTION MEDICATION MANAGEMENT

## 2024-12-03 PROCEDURE — ? COSMETIC CONSULTATION: HYDRAFACIAL

## 2024-12-03 PROCEDURE — ? COUNSELING

## 2024-12-03 PROCEDURE — ? PRESCRIPTION

## 2024-12-03 PROCEDURE — ? COSMETIC CONSULTATION - MICRO-NEEDLING

## 2024-12-03 PROCEDURE — 99214 OFFICE O/P EST MOD 30 MIN: CPT

## 2024-12-03 RX ORDER — TRETIONIN 1 MG/G
CREAM TOPICAL
Qty: 45 | Refills: 1 | Status: ERX | COMMUNITY
Start: 2024-12-03

## 2024-12-03 RX ORDER — SPIRONOLACTONE 100 MG/1
TABLET, FILM COATED ORAL
Qty: 60 | Refills: 3 | Status: ERX | COMMUNITY
Start: 2024-12-03

## 2024-12-03 RX ORDER — HYDROQUINO/TRETINOIN/TRIAMCINO 4 %-0.025%
EMULSION (GRAM) TOPICAL
Qty: 30 | Refills: 1 | Status: ERX | COMMUNITY
Start: 2024-12-03

## 2024-12-03 RX ORDER — AZELAIC ACID 0.15 G/G
GEL TOPICAL
Qty: 50 | Refills: 3 | Status: ERX

## 2024-12-03 RX ADMIN — TRETIONIN: 1 CREAM TOPICAL at 00:00

## 2024-12-03 RX ADMIN — SPIRONOLACTONE: 100 TABLET, FILM COATED ORAL at 00:00

## 2024-12-03 RX ADMIN — Medication: at 00:00

## 2024-12-03 ASSESSMENT — LOCATION DETAILED DESCRIPTION DERM
LOCATION DETAILED: LEFT INFERIOR CENTRAL MALAR CHEEK
LOCATION DETAILED: LEFT INFERIOR MEDIAL MALAR CHEEK

## 2024-12-03 ASSESSMENT — LOCATION SIMPLE DESCRIPTION DERM: LOCATION SIMPLE: LEFT CHEEK

## 2024-12-03 ASSESSMENT — LOCATION ZONE DERM: LOCATION ZONE: FACE

## 2024-12-03 NOTE — HPI: COSMETIC CONSULTATION
When Outside In The Sun, Do You...: never burns, always tans
Additional History: Currently using night time la Roche posay foaming cleanser, revision Vit C,  moisturizer, azaelic acid, SPF\\n\\nSkin feels normal throughout the day

## 2024-12-03 NOTE — PROCEDURE: PRESCRIPTION MEDICATION MANAGEMENT
Render In Strict Bullet Format?: No
Continue Regimen: dapsone 5 % topical gel : Apply to affected areas of the face every morning\\n\\nVitamin C Complex: apply to the face every morning
Detail Level: Zone
Modify Regimen: spironolactone 100 mg tablet : Take 1 tablet twice a day
Modify Regimen: Alternate Kataraxap and tretinoin
Detail Level: Simple

## 2024-12-03 NOTE — PROCEDURE: COSMETIC CONSULTATION - MICRO-NEEDLING
Procedure Quote $ (Will Render In Note. Use Numbers Only, No Text Please.): 329
Detail Level: Zone
Send Procedure Quote As Charge: No

## 2024-12-11 ENCOUNTER — OFFICE VISIT (OUTPATIENT)
Dept: URBAN - METROPOLITAN AREA CLINIC 63 | Facility: CLINIC | Age: 34
End: 2024-12-11

## 2024-12-11 NOTE — HPI-ZZZTODAY'S VISIT
Patient is a very pleasant 34-year-old female who presents for hemorrhoids.  She is a new patient to our practice and be establishing with Dr. Quevedo today.  Past medical history significant for Past surgical history reviewed. Last colonoscopy Last endoscopy Family history noncontributory.

## 2024-12-17 ENCOUNTER — RX ONLY (RX ONLY)
Age: 34
End: 2024-12-17

## 2024-12-17 RX ORDER — SPIRONOLACTONE 100 MG/1
TABLET, FILM COATED ORAL
Qty: 60 | Refills: 0 | Status: ERX